# Patient Record
Sex: MALE | Race: WHITE | NOT HISPANIC OR LATINO | Employment: FULL TIME | URBAN - METROPOLITAN AREA
[De-identification: names, ages, dates, MRNs, and addresses within clinical notes are randomized per-mention and may not be internally consistent; named-entity substitution may affect disease eponyms.]

---

## 2024-04-22 ENCOUNTER — HOSPITAL ENCOUNTER (EMERGENCY)
Facility: CLINIC | Age: 64
Discharge: HOME | End: 2024-04-22
Attending: FAMILY MEDICINE | Admitting: FAMILY MEDICINE
Payer: MEDICARE

## 2024-04-22 ENCOUNTER — APPOINTMENT (OUTPATIENT)
Dept: CT IMAGING | Facility: CLINIC | Age: 64
End: 2024-04-22
Attending: FAMILY MEDICINE
Payer: MEDICARE

## 2024-04-22 ENCOUNTER — APPOINTMENT (OUTPATIENT)
Dept: GENERAL RADIOLOGY | Facility: CLINIC | Age: 64
End: 2024-04-22
Attending: FAMILY MEDICINE
Payer: MEDICARE

## 2024-04-22 VITALS
TEMPERATURE: 97.5 F | OXYGEN SATURATION: 100 % | SYSTOLIC BLOOD PRESSURE: 119 MMHG | HEART RATE: 60 BPM | BODY MASS INDEX: 25.9 KG/M2 | WEIGHT: 185 LBS | RESPIRATION RATE: 15 BRPM | DIASTOLIC BLOOD PRESSURE: 68 MMHG | HEIGHT: 71 IN

## 2024-04-22 DIAGNOSIS — R07.81 PLEURODYNIA: ICD-10-CM

## 2024-04-22 DIAGNOSIS — I26.93 SINGLE SUBSEGMENTAL PULMONARY EMBOLISM WITHOUT ACUTE COR PULMONALE (H): ICD-10-CM

## 2024-04-22 DIAGNOSIS — R07.89 LEFT-SIDED CHEST WALL PAIN: ICD-10-CM

## 2024-04-22 LAB
ALBUMIN SERPL BCG-MCNC: 4.4 G/DL (ref 3.5–5.2)
ALP SERPL-CCNC: 47 U/L (ref 40–150)
ALT SERPL W P-5'-P-CCNC: 25 U/L (ref 0–70)
ANION GAP SERPL CALCULATED.3IONS-SCNC: 13 MMOL/L (ref 7–15)
APTT PPP: 20 SECONDS (ref 22–38)
AST SERPL W P-5'-P-CCNC: 31 U/L (ref 0–45)
ATRIAL RATE - MUSE: 57 BPM
BASOPHILS # BLD AUTO: 0.1 10E3/UL (ref 0–0.2)
BASOPHILS NFR BLD AUTO: 1 %
BILIRUB SERPL-MCNC: 0.7 MG/DL
BUN SERPL-MCNC: 13.7 MG/DL (ref 8–23)
CALCIUM SERPL-MCNC: 9.4 MG/DL (ref 8.8–10.2)
CHLORIDE SERPL-SCNC: 104 MMOL/L (ref 98–107)
CREAT BLD-MCNC: 1 MG/DL (ref 0.7–1.3)
CREAT SERPL-MCNC: 0.96 MG/DL (ref 0.67–1.17)
CRP SERPL-MCNC: <3 MG/L
D DIMER PPP FEU-MCNC: 0.44 UG/ML FEU (ref 0–0.5)
DEPRECATED HCO3 PLAS-SCNC: 25 MMOL/L (ref 22–29)
DIASTOLIC BLOOD PRESSURE - MUSE: NORMAL MMHG
EGFRCR SERPLBLD CKD-EPI 2021: 88 ML/MIN/1.73M2
EGFRCR SERPLBLD CKD-EPI 2021: >60 ML/MIN/1.73M2
EOSINOPHIL # BLD AUTO: 0.2 10E3/UL (ref 0–0.7)
EOSINOPHIL NFR BLD AUTO: 3 %
ERYTHROCYTE [DISTWIDTH] IN BLOOD BY AUTOMATED COUNT: 12.3 % (ref 10–15)
GLUCOSE SERPL-MCNC: 112 MG/DL (ref 70–99)
HCT VFR BLD AUTO: 44 % (ref 40–53)
HGB BLD-MCNC: 14.6 G/DL (ref 13.3–17.7)
HOLD SPECIMEN: NORMAL
IMM GRANULOCYTES # BLD: 0 10E3/UL
IMM GRANULOCYTES NFR BLD: 1 %
INR PPP: 0.93 (ref 0.85–1.15)
INTERPRETATION ECG - MUSE: NORMAL
LIPASE SERPL-CCNC: 21 U/L (ref 13–60)
LYMPHOCYTES # BLD AUTO: 2.2 10E3/UL (ref 0.8–5.3)
LYMPHOCYTES NFR BLD AUTO: 34 %
MAGNESIUM SERPL-MCNC: 2.4 MG/DL (ref 1.7–2.3)
MCH RBC QN AUTO: 30.2 PG (ref 26.5–33)
MCHC RBC AUTO-ENTMCNC: 33.2 G/DL (ref 31.5–36.5)
MCV RBC AUTO: 91 FL (ref 78–100)
MONOCYTES # BLD AUTO: 0.6 10E3/UL (ref 0–1.3)
MONOCYTES NFR BLD AUTO: 9 %
NEUTROPHILS # BLD AUTO: 3.6 10E3/UL (ref 1.6–8.3)
NEUTROPHILS NFR BLD AUTO: 52 %
NRBC # BLD AUTO: 0 10E3/UL
NRBC BLD AUTO-RTO: 0 /100
NT-PROBNP SERPL-MCNC: <36 PG/ML (ref 0–900)
P AXIS - MUSE: NORMAL DEGREES
PLATELET # BLD AUTO: 134 10E3/UL (ref 150–450)
POTASSIUM SERPL-SCNC: 4.5 MMOL/L (ref 3.4–5.3)
PR INTERVAL - MUSE: 140 MS
PROT SERPL-MCNC: 7.3 G/DL (ref 6.4–8.3)
QRS DURATION - MUSE: 96 MS
QT - MUSE: 412 MS
QTC - MUSE: 401 MS
R AXIS - MUSE: 169 DEGREES
RADIOLOGIST FLAGS: ABNORMAL
RBC # BLD AUTO: 4.84 10E6/UL (ref 4.4–5.9)
SODIUM SERPL-SCNC: 142 MMOL/L (ref 135–145)
SYSTOLIC BLOOD PRESSURE - MUSE: NORMAL MMHG
T AXIS - MUSE: 159 DEGREES
TROPONIN T SERPL HS-MCNC: 7 NG/L
TROPONIN T SERPL HS-MCNC: 9 NG/L
VENTRICULAR RATE- MUSE: 57 BPM
WBC # BLD AUTO: 6.6 10E3/UL (ref 4–11)

## 2024-04-22 PROCEDURE — 250N000011 HC RX IP 250 OP 636: Performed by: FAMILY MEDICINE

## 2024-04-22 PROCEDURE — 71046 X-RAY EXAM CHEST 2 VIEWS: CPT

## 2024-04-22 PROCEDURE — 99285 EMERGENCY DEPT VISIT HI MDM: CPT | Mod: 25 | Performed by: FAMILY MEDICINE

## 2024-04-22 PROCEDURE — 85730 THROMBOPLASTIN TIME PARTIAL: CPT | Performed by: FAMILY MEDICINE

## 2024-04-22 PROCEDURE — 93005 ELECTROCARDIOGRAM TRACING: CPT | Performed by: FAMILY MEDICINE

## 2024-04-22 PROCEDURE — 93010 ELECTROCARDIOGRAM REPORT: CPT | Performed by: FAMILY MEDICINE

## 2024-04-22 PROCEDURE — 258N000003 HC RX IP 258 OP 636: Performed by: FAMILY MEDICINE

## 2024-04-22 PROCEDURE — 86140 C-REACTIVE PROTEIN: CPT | Performed by: FAMILY MEDICINE

## 2024-04-22 PROCEDURE — 85025 COMPLETE CBC W/AUTO DIFF WBC: CPT | Performed by: FAMILY MEDICINE

## 2024-04-22 PROCEDURE — 83880 ASSAY OF NATRIURETIC PEPTIDE: CPT | Performed by: FAMILY MEDICINE

## 2024-04-22 PROCEDURE — 36415 COLL VENOUS BLD VENIPUNCTURE: CPT | Performed by: FAMILY MEDICINE

## 2024-04-22 PROCEDURE — 84484 ASSAY OF TROPONIN QUANT: CPT | Performed by: FAMILY MEDICINE

## 2024-04-22 PROCEDURE — 80053 COMPREHEN METABOLIC PANEL: CPT | Performed by: FAMILY MEDICINE

## 2024-04-22 PROCEDURE — G1010 CDSM STANSON: HCPCS

## 2024-04-22 PROCEDURE — 82565 ASSAY OF CREATININE: CPT | Mod: 91

## 2024-04-22 PROCEDURE — 96360 HYDRATION IV INFUSION INIT: CPT | Mod: 59 | Performed by: FAMILY MEDICINE

## 2024-04-22 PROCEDURE — 85610 PROTHROMBIN TIME: CPT | Performed by: FAMILY MEDICINE

## 2024-04-22 PROCEDURE — 96361 HYDRATE IV INFUSION ADD-ON: CPT | Performed by: FAMILY MEDICINE

## 2024-04-22 PROCEDURE — 85379 FIBRIN DEGRADATION QUANT: CPT | Performed by: FAMILY MEDICINE

## 2024-04-22 PROCEDURE — 83735 ASSAY OF MAGNESIUM: CPT | Performed by: FAMILY MEDICINE

## 2024-04-22 PROCEDURE — 250N000013 HC RX MED GY IP 250 OP 250 PS 637: Performed by: FAMILY MEDICINE

## 2024-04-22 PROCEDURE — 250N000009 HC RX 250: Performed by: FAMILY MEDICINE

## 2024-04-22 PROCEDURE — 83690 ASSAY OF LIPASE: CPT | Performed by: FAMILY MEDICINE

## 2024-04-22 RX ORDER — IOPAMIDOL 755 MG/ML
100 INJECTION, SOLUTION INTRAVASCULAR ONCE
Status: COMPLETED | OUTPATIENT
Start: 2024-04-22 | End: 2024-04-22

## 2024-04-22 RX ORDER — NITROGLYCERIN 0.4 MG/1
0.4 TABLET SUBLINGUAL ONCE
Status: COMPLETED | OUTPATIENT
Start: 2024-04-22 | End: 2024-04-22

## 2024-04-22 RX ORDER — ASPIRIN 81 MG/1
162 TABLET, CHEWABLE ORAL ONCE
Status: COMPLETED | OUTPATIENT
Start: 2024-04-22 | End: 2024-04-22

## 2024-04-22 RX ORDER — LIDOCAINE 40 MG/G
CREAM TOPICAL
Status: DISCONTINUED | OUTPATIENT
Start: 2024-04-22 | End: 2024-04-22 | Stop reason: HOSPADM

## 2024-04-22 RX ADMIN — SODIUM CHLORIDE 1000 ML: 9 INJECTION, SOLUTION INTRAVENOUS at 10:37

## 2024-04-22 RX ADMIN — IOPAMIDOL 64 ML: 755 INJECTION, SOLUTION INTRAVENOUS at 13:57

## 2024-04-22 RX ADMIN — ASPIRIN 81 MG CHEWABLE TABLET 162 MG: 81 TABLET CHEWABLE at 10:36

## 2024-04-22 RX ADMIN — NITROGLYCERIN 0.4 MG: 0.4 TABLET SUBLINGUAL at 12:21

## 2024-04-22 RX ADMIN — SODIUM CHLORIDE 84 ML: 9 INJECTION, SOLUTION INTRAVENOUS at 13:57

## 2024-04-22 ASSESSMENT — COLUMBIA-SUICIDE SEVERITY RATING SCALE - C-SSRS
1. IN THE PAST MONTH, HAVE YOU WISHED YOU WERE DEAD OR WISHED YOU COULD GO TO SLEEP AND NOT WAKE UP?: NO
2. HAVE YOU ACTUALLY HAD ANY THOUGHTS OF KILLING YOURSELF IN THE PAST MONTH?: NO
6. HAVE YOU EVER DONE ANYTHING, STARTED TO DO ANYTHING, OR PREPARED TO DO ANYTHING TO END YOUR LIFE?: NO

## 2024-04-22 ASSESSMENT — ACTIVITIES OF DAILY LIVING (ADL)
ADLS_ACUITY_SCORE: 33
ADLS_ACUITY_SCORE: 35

## 2024-04-22 NOTE — DISCHARGE INSTRUCTIONS
Home.  Your labs look good.  Your xray did not show any concerns.  Your EKG was done also without any concerns for heart injury.  Heart enzymes and blood clot tests negative.  Bedside echo did not show any obvious concerns or fluid around the heart.  We did do a CT of your chest which did not show any pneumonia, tumors, changes in your aorta.  The CT did show a small single nonocclusive blood clot in the right upper lung. Discussed with Hematology who felt with small clot in the right lung with normal d dimer and no symptoms on that side, unlikely this is causing your symptoms and more likely an incidental finding and not starting on blood thinners is reasonable approach.  See MD this week for a recheck.  Tylenol and ibuprofen and ice as needed.  Return if any concerns or worse symptoms.    Results for orders placed or performed during the hospital encounter of 04/22/24   XR Chest 2 Views     Status: None    Narrative    CHEST TWO VIEWS  4/22/2024 11:08 AM     HISTORY:  Chest pain.    COMPARISON: None.      Impression    IMPRESSION: Negative chest. Lungs clear.    CASSY OBANDO MD         SYSTEM ID:  [id number]   POC US ECHO LIMITED     Status: None    Impression    Limited Bedside Cardiac Ultrasound, performed and interpreted by me.   Indication: Chest Pain.  Parasternal long axis, parasternal short axis, and apical 4 chamber views were acquired.   Image quality was satisfactory.    Findings:    Global left ventricular function appears intact.  Chambers do not appear dilated.  There is no evidence of free fluid within the pericardium.    IMPRESSION: Grossly normal left ventricular function and chamber size.  No pericardial effusion..       CT Chest Pulmonary Embolism w Contrast     Status: Abnormal (Preliminary result)   Result Value Ref Range    Radiologist flags Pulmonary embolus (AA)     Narrative    CT CHEST PULMONARY EMBOLISM W CONTRAST 4/22/2024 2:12 PM    CLINICAL HISTORY: Shortness of breath. Pleuritic  chest pain.    TECHNIQUE: CT angiogram chest during arterial phase injection IV  contrast. 2D and 3D MIP reconstructions were performed by the CT  technologist. Dose reduction techniques were used.   CONTRAST: 64 mL Isovue 370    COMPARISON: None.    FINDINGS:  ANGIOGRAM CHEST: There is a small nonocclusive pulmonary embolus  within a subsegmental branch to the right upper lobe. No pulmonary  emboli are identified on the left. No convincing CT evidence of right  heart strain. Thoracic aorta is negative for dissection.     LUNGS AND PLEURA: Small calcified granuloma in the left upper lobe. No  lung masses or consolidations. No pleural effusions.    MEDIASTINUM/AXILLAE: No lymphadenopathy in the chest. No pericardial  effusion.    CORONARY ARTERY CALCIFICATION: None.    UPPER ABDOMEN: Limited views of the upper abdomen are unremarkable.    MUSCULOSKELETAL: Unremarkable.      Impression    IMPRESSION:  1.  Small nonocclusive pulmonary embolus within a subsegmental branch  of the right upper lobe.  2.  No other pulmonary emboli are identified.    [Critical Result: Pulmonary embolus]    Finding was identified on 4/22/2024 2:25 PM.     Dr. Sandoval was contacted by me on 4/22/2024 2:25 PM and verbalized  understanding of the critical result.    [place] Draw     Status: None (In process)    Narrative    The following orders were created for panel order [place] Draw.  Procedure                               Abnormality         Status                     ---------                               -----------         ------                     Extra Blue Top Tube[[id number]]                                                         Extra Red Top Tube[[id number]]                               Final result               Extra Green Top (Lithium...[[id number]]                                                 Extra Purple Top Tube[[id number]]                                                         Please view results for these tests on the  individual orders.   CRP inflammation     Status: Normal   Result Value Ref Range    CRP Inflammation <3.00 <5.00 mg/L   INR     Status: Normal   Result Value Ref Range    INR 0.93 0.85 - 1.15   Partial thromboplastin time     Status: Abnormal   Result Value Ref Range    aPTT 20 (L) 22 - 38 Seconds   D dimer quantitative     Status: Normal   Result Value Ref Range    D-Dimer Quantitative 0.44 0.00 - 0.50 ug/mL FEU    Narrative    This D-dimer assay is intended for use in conjunction with a clinical pretest probability assessment model to exclude pulmonary embolism (PE) and deep venous thrombosis (DVT) in outpatients suspected of PE or DVT. The cut-off value is 0.50 ug/mL FEU.    For patients 50 years of age or older, the application of age-adjusted cut-off values for D-Dimer may increase the specificity without significant effect on sensitivity. The literature suggested calculation age adjusted cut-off in ug/L = age in years x 10 ug/L. The results in this laboratory are reported as ug/mL rather than ug/L. The calculation for age adjusted cut off in ug/mL= age in years x 0.01 ug/mL. For example, the cut off for a 76 year old male is 74 x 0.01 ug/mL = 0.76 ug/mL (760 ug/L).    M Tabby et al. Age adjusted D-dimer cut-off levels to rule out pulmonary embolism: The ADJUST-PE Study. BROOK 2014;311:2277-6643.; HJ Lizett et al. Diagnostic accuracy of conventional or age adjusted D-dimer cutoff values in older patients with suspected venous thromboembolism. Systemic review and meta-analysis. BMJ 2013:346:f2492.   Comprehensive metabolic panel     Status: Abnormal   Result Value Ref Range    Sodium 142 135 - 145 mmol/L    Potassium 4.5 3.4 - 5.3 mmol/L    Carbon Dioxide (CO2) 25 22 - 29 mmol/L    Anion Gap 13 7 - 15 mmol/L    Urea Nitrogen 13.7 8.0 - 23.0 mg/dL    Creatinine 0.96 0.67 - 1.17 mg/dL    GFR Estimate 88 >60 mL/min/1.73m2    Calcium 9.4 8.8 - 10.2 mg/dL    Chloride 104 98 - 107 mmol/L    Glucose 112 (H) 70 - 99  mg/dL    Alkaline Phosphatase 47 40 - 150 U/L    AST 31 0 - 45 U/L    ALT 25 0 - 70 U/L    Protein Total 7.3 6.4 - 8.3 g/dL    Albumin 4.4 3.5 - 5.2 g/dL    Bilirubin Total 0.7 <=1.2 mg/dL   Magnesium     Status: Abnormal   Result Value Ref Range    Magnesium 2.4 (H) 1.7 - 2.3 mg/dL   Lipase     Status: Normal   Result Value Ref Range    Lipase 21 13 - 60 U/L   Troponin T, High Sensitivity     Status: Normal   Result Value Ref Range    Troponin T, High Sensitivity 9 <=22 ng/L   Nt probnp inpatient (BNP)     Status: Normal   Result Value Ref Range    N terminal Pro BNP Inpatient <36 0 - 900 pg/mL   Extra Red Top Tube     Status: None   Result Value Ref Range    Hold Specimen JI    CBC with platelets and differential     Status: Abnormal   Result Value Ref Range    WBC Count 6.6 4.0 - 11.0 10e3/uL    RBC Count 4.84 4.40 - 5.90 10e6/uL    Hemoglobin 14.6 13.3 - 17.7 g/dL    Hematocrit 44.0 40.0 - 53.0 %    MCV 91 78 - 100 fL    MCH 30.2 26.5 - 33.0 pg    MCHC 33.2 31.5 - 36.5 g/dL    RDW 12.3 10.0 - 15.0 %    Platelet Count 134 (L) 150 - 450 10e3/uL    % Neutrophils 52 %    % Lymphocytes 34 %    % Monocytes 9 %    % Eosinophils 3 %    % Basophils 1 %    % Immature Granulocytes 1 %    NRBCs per 100 WBC 0 <1 /100    Absolute Neutrophils 3.6 1.6 - 8.3 10e3/uL    Absolute Lymphocytes 2.2 0.8 - 5.3 10e3/uL    Absolute Monocytes 0.6 0.0 - 1.3 10e3/uL    Absolute Eosinophils 0.2 0.0 - 0.7 10e3/uL    Absolute Basophils 0.1 0.0 - 0.2 10e3/uL    Absolute Immature Granulocytes 0.0 <=0.4 10e3/uL    Absolute NRBCs 0.0 10e3/uL   Troponin T, High Sensitivity     Status: Normal   Result Value Ref Range    Troponin T, High Sensitivity 7 <=22 ng/L   Creatinine POCT     Status: Normal   Result Value Ref Range    Creatinine POCT 1.0 0.7 - 1.3 mg/dL    GFR, ESTIMATED POCT >60 >60 mL/min/1.73m2   EKG 12 lead     Status: None   Result Value Ref Range    Systolic Blood Pressure  mmHg    Diastolic Blood Pressure  mmHg    Ventricular Rate 57  BPM    Atrial Rate 57 BPM    MT Interval 140 ms    QRS Duration 96 ms     ms    QTc 401 ms    P Axis  degrees    R AXIS 169 degrees    T Axis 159 degrees    Interpretation ECG       Sinus bradycardia with Premature atrial complexes in a pattern of bigeminy  Right axis deviation  Septal infarct , age undetermined  Abnormal ECG  Unconfirmed report - interpretation of this ECG is computer generated - see medical record for final interpretation    Confirmed by - EMERGENCY ROOM, PHYSICIAN ([id number]),  DONYA WINSLOW ([id number]) on 4/22/2024 3:30:31 PM     CBC with platelets differential     Status: Abnormal    Narrative    The following orders were created for panel order CBC with platelets differential.  Procedure                               Abnormality         Status                     ---------                               -----------         ------                     CBC with platelets and d...[[id number]]  Abnormal            Final result                 Please view results for these tests on the individual orders.

## 2024-04-22 NOTE — ED TRIAGE NOTES
Patient reports pain is worst when he is moving around. Wife also reports chest hurts when he takes a deep breath     Triage Assessment (Adult)       Row Name 04/22/24 0959          Triage Assessment    Airway WDL WDL        Respiratory WDL    Respiratory WDL WDL        Skin Circulation/Temperature WDL    Skin Circulation/Temperature WDL WDL        Cardiac WDL    Cardiac WDL X;chest pain     Cardiac Rhythm NSR        Chest Pain Assessment    Chest Pain Location midsternal     Character aching     Precipitating Factors activity

## 2024-04-22 NOTE — ED PROVIDER NOTES
ED Provider Note  [facility]      History     Chief Complaint   Patient presents with    Chest Pain     Intermittent Chest pain x 3 days, pain is described as aching and pain radiates to his upper back.     HPI  Casey Yeboah is a 63 year old male who presents emergency room with chest pain over the last few days.  Patient notes last few days having initially some left shoulder pain but then noted some chest pain is somewhat worse with deep breathing and moving around.  Patient notes all left-sided and in the left shoulder blade only.  No rash noted.  Recently had a shingles vaccine.  Some shortness of breath associated with the 2 some pain in the back also.  Patient apparently was getting go to have a abdominal aortic aneurysm screening done as he had remote history of smoking several years ago but is not a current smoker had no abdominal symptoms otherwise.  He does not have documented history of any type of dissection or aneurysm or other vascular disease no history cardiac disease.  Patient otherwise relatively healthy.  No rash reported.  Pain is worse with any movement and deep breathing not necessarily just with sitting up and laying back to visit altered.    No leg pain or leg swelling no history of DVTs or family history cardiac disease or VTE.  Patient now had traveled to [facility] had returned slightly over a month ago but did not experience any of the symptoms etc.  Now presents with his wife for further evaluation.  As noted the pain is not necessarily improved sitting up and worsening leg back is just worsened with some rotation increasing pain over the lateral chest area and also the shoulder blade area with movement.  At rest feels relatively comfortable.  No fevers or chills otherwise noted.    Past Medical History  Past Medical History:   Diagnosis Date    MEDICAL HISTORY OF -     right foot pain     Past Surgical History:   Procedure Laterality Date    SURGICAL  "HISTORY OF -   1978    gastric lavage     No current outpatient medications on file.    Allergies   Allergen Reactions    Penicillins Rash     Family History  Family History   Problem Relation Age of Onset    Cancer Mother         fatal renal cancer with mets     Social History   Social History     Tobacco Use    Smoking status: Former    Smokeless tobacco: Never    Tobacco comments:     smoked from 72 until 88; smoke on average 1ppd   Substance Use Topics    Alcohol use: Yes     Comment: 6 drinks per week         A medically appropriate review of systems was performed with pertinent positives and negatives noted in the HPI, and all other systems negative.    Physical Exam   BP: 125/67  Pulse: 67  Temp: 97.5  F (36.4  C)  Resp: 16  Height: 179.1 cm (5' 10.5\")  Weight: 83.9 kg (185 lb)  SpO2: 99 %  Physical Exam  Vitals and nursing note reviewed.   Constitutional:       General: He is in acute distress.      Appearance: He is well-developed. He is not toxic-appearing or diaphoretic.      Comments: Stable here otherwise speaking full sentences not writhing in pain not diaphoretic.   HENT:      Head: Normocephalic and atraumatic.      Nose: Nose normal. No congestion.      Mouth/Throat:      Mouth: Mucous membranes are moist.      Pharynx: Oropharynx is clear.   Eyes:      General: No scleral icterus.     Extraocular Movements: Extraocular movements intact.      Conjunctiva/sclera: Conjunctivae normal.      Pupils: Pupils are equal, round, and reactive to light.   Cardiovascular:      Rate and Rhythm: Normal rate and regular rhythm.   Pulmonary:      Effort: Pulmonary effort is normal. No respiratory distress.      Comments: Lateral left chest area in the rhomboidal area etc. some tenderness on the palpation and movement.  Chest:      Chest wall: Tenderness present.   Abdominal:      General: Abdomen is flat. There is no distension.      Palpations: Abdomen is soft. There is no mass.      Tenderness: There is no " abdominal tenderness. There is no guarding or rebound.      Hernia: No hernia is present.   Musculoskeletal:         General: No swelling, tenderness, deformity or signs of injury. Normal range of motion.      Cervical back: Normal range of motion and neck supple.      Right lower leg: No edema.      Left lower leg: No edema.      Comments: Negative Homans' sign   Lymphadenopathy:      Cervical: No cervical adenopathy.   Skin:     General: Skin is warm and dry.      Capillary Refill: Capillary refill takes less than 2 seconds.      Coloration: Skin is not jaundiced or pale.      Findings: No erythema or rash.   Neurological:      General: No focal deficit present.      Mental Status: He is alert and oriented to person, place, and time.      Cranial Nerves: No cranial nerve deficit.      Sensory: No sensory deficit.      Coordination: Coordination normal.   Psychiatric:         Mood and Affect: Mood normal.         Thought Content: Thought content normal.         Judgment: Judgment normal.           ED Course, Procedures, & Data        Records are in epic.  Patient with prediabetes hyperlipidemia screening etc.  History of obstructive sleep apnea noted.  Routine screening were ordered.    EKG done revealing sinus rhythm with premature atrial complexes no tachycardia no marked ST elevation seen or depression.  Aspirin ordered.  IV with labs imaging we will get a bedside ultrasound consider further imaging did add a D-dimer to rule out other things such as pericarditis PE versus aortic issues.    Patient given 2 baby aspirin here in the ER while during workup.  EKG as noted was done revealing sinus bradycardia with some PACs.  No other hyperacute changes.  2 view chest x-ray done revealing no pneumothorax no infiltrates no effusions no florid heart failure interpreted by myself.    Point-of-care ultrasound done by myself reveals no aortic root or aortic valvular changes mitral valve appears normal no pericardial  effusion wall function does not show any obvious wall motion abnormalities that are visible.  Laboratory testing white count was 6.6 hemoglobin is 14.6.  Platelets are 134 D-dimer 0.44 within normal limits  INR 0.93.  Sodium 142 potassium 4.5 bicarb is 25 gap 13.  Creatinine 0.96.  Glucose 112 liver function test normal and his magnesium 2.4.  Lipase 21 CRP is negative  Troponin x 2 were 9 and 7 in decreasing fashion.    Here in the ER with patient's symptoms only left-sided CT scan was done of the chest PE protocol.  Discussed with radiology findings reveal that of 1 small right upper lobe subsegmental pulmonary emboli age-indeterminate without any signs of infarction there are no other abnormalities there are no other emboli there is no sign of heart strain aorta appears normal without any other acute infiltrates or masses.    Reviewed with patient and wife I talked to Dr. Pemberton hematology.  Discussed with patient also feel at this point since it is a subsegmental nonocclusive small single pulmonary emboli unclear age without any other associated symptoms or any other risk factors no signs of any DVT etc. no family history no history of cancer felt surveillance is appropriate this point especially he does not have any symptoms on his right side where this is located and his D-dimer is within normal limits.  Discussed with wife and patient along with patient's daughter who is a physician and agree at this point there are certainly risks of placing the patient on a DOAC bleeding risks and patient does agree at this point his pain is all reproducible with any twisting or movement on that left side only I do not see any zoster but he should watch for rash he is comfortable going home will treat more for pleurodynia pleurisy chest wall type pain and monitor symptoms with close follow-up with MD for surveillance.  If any changes should return but at this point patient feels fine I think that is very appropriate that he  is not treated for this solitary was likely incidental pulmonary emboli that may have occurred from his airplane trip to [facility] in the last few months.    At this point patient is comfortable wants to go home wife agrees patient be discharged home I did talk to patient's daughter who is a physician also agreed with our plan.  Recommendation for close follow-up in the next 2 days with primary physician to review all images etc. and further evaluation if needed.  Patient will return if any concerns at all.  He is comfortable's plan and discharge.      Procedures  Results for orders placed during the hospital encounter of 04/22/24    POC US ECHO LIMITED    Impression  Limited Bedside Cardiac Ultrasound, performed and interpreted by me.  Indication: Chest Pain.  Parasternal long axis, parasternal short axis, and apical 4 chamber views were acquired.  Image quality was satisfactory.    Findings:  Global left ventricular function appears intact.  Chambers do not appear dilated.  There is no evidence of free fluid within the pericardium.    IMPRESSION: Grossly normal left ventricular function and chamber size.  No pericardial effusion..            EKG Interpretation:      Interpreted by Thomas Sandoval MD  Time reviewed: 1004  Symptoms at time of EKG: cp   Rhythm: normal sinus burke  Rate: normal 57  Axis: right  Ectopy: none  Conduction: occ pac's  ST Segments/ T Waves: No hyperacute ST-T wave changes  Q Waves: none  Comparison to prior: No old EKG available    Clinical Impression: sinus burke with pac's          Results for orders placed or performed during the hospital encounter of 04/22/24   XR Chest 2 Views     Status: None    Narrative    CHEST TWO VIEWS  4/22/2024 11:08 AM     HISTORY:  Chest pain.    COMPARISON: None.      Impression    IMPRESSION: Negative chest. Lungs clear.    CASSY OBANDO MD         SYSTEM ID:  [id number]   POC US ECHO LIMITED     Status: None    Impression    Limited Bedside  Cardiac Ultrasound, performed and interpreted by me.   Indication: Chest Pain.  Parasternal long axis, parasternal short axis, and apical 4 chamber views were acquired.   Image quality was satisfactory.    Findings:    Global left ventricular function appears intact.  Chambers do not appear dilated.  There is no evidence of free fluid within the pericardium.    IMPRESSION: Grossly normal left ventricular function and chamber size.  No pericardial effusion..       CT Chest Pulmonary Embolism w Contrast     Status: Abnormal   Result Value Ref Range    Radiologist flags Pulmonary embolus (AA)     Narrative    CT CHEST PULMONARY EMBOLISM W CONTRAST 4/22/2024 2:12 PM    CLINICAL HISTORY: Shortness of breath. Pleuritic chest pain.    TECHNIQUE: CT angiogram chest during arterial phase injection IV  contrast. 2D and 3D MIP reconstructions were performed by the CT  technologist. Dose reduction techniques were used.   CONTRAST: 64 mL Isovue 370    COMPARISON: None.    FINDINGS:  ANGIOGRAM CHEST: There is a small nonocclusive pulmonary embolus  within a subsegmental branch to the right upper lobe. No pulmonary  emboli are identified on the left. No convincing CT evidence of right  heart strain. Thoracic aorta is negative for dissection.     LUNGS AND PLEURA: Small calcified granuloma in the left upper lobe. No  lung masses or consolidations. No pleural effusions.    MEDIASTINUM/AXILLAE: No lymphadenopathy in the chest. No pericardial  effusion.    CORONARY ARTERY CALCIFICATION: None.    UPPER ABDOMEN: Limited views of the upper abdomen are unremarkable.    MUSCULOSKELETAL: Unremarkable.      Impression    IMPRESSION:  1.  Small nonocclusive pulmonary embolus within a subsegmental branch  of the right upper lobe.  2.  No other pulmonary emboli are identified.    [Critical Result: Pulmonary embolus]    Finding was identified on 4/22/2024 2:25 PM.     Dr. Sandoval was contacted by me on 4/22/2024 2:25 PM and  verbalized  understanding of the critical result.     CASSY OBANDO MD         SYSTEM ID:  [id number]   [place] Draw     Status: None (In process)    Narrative    The following orders were created for panel order [place] Draw.  Procedure                               Abnormality         Status                     ---------                               -----------         ------                     Extra Blue Top Tube[[id number]]                                                         Extra Red Top Tube[[id number]]                               Final result               Extra Green Top (Lithium...[[id number]]                                                 Extra Purple Top Tube[[id number]]                                                         Please view results for these tests on the individual orders.   CRP inflammation     Status: Normal   Result Value Ref Range    CRP Inflammation <3.00 <5.00 mg/L   INR     Status: Normal   Result Value Ref Range    INR 0.93 0.85 - 1.15   Partial thromboplastin time     Status: Abnormal   Result Value Ref Range    aPTT 20 (L) 22 - 38 Seconds   D dimer quantitative     Status: Normal   Result Value Ref Range    D-Dimer Quantitative 0.44 0.00 - 0.50 ug/mL FEU    Narrative    This D-dimer assay is intended for use in conjunction with a clinical pretest probability assessment model to exclude pulmonary embolism (PE) and deep venous thrombosis (DVT) in outpatients suspected of PE or DVT. The cut-off value is 0.50 ug/mL FEU.    For patients 50 years of age or older, the application of age-adjusted cut-off values for D-Dimer may increase the specificity without significant effect on sensitivity. The literature suggested calculation age adjusted cut-off in ug/L = age in years x 10 ug/L. The results in this laboratory are reported as ug/mL rather than ug/L. The calculation for age adjusted cut off in ug/mL= age in years x 0.01 ug/mL. For example, the cut off for a 76 year old male is 74  x 0.01 ug/mL = 0.76 ug/mL (760 ug/L).    M Tabby et al. Age adjusted D-dimer cut-off levels to rule out pulmonary embolism: The ADJUST-PE Study. BROOK 2014;311:8484-4266.; HJ Lizett et al. Diagnostic accuracy of conventional or age adjusted D-dimer cutoff values in older patients with suspected venous thromboembolism. Systemic review and meta-analysis. BMJ 2013:346:f2492.   Comprehensive metabolic panel     Status: Abnormal   Result Value Ref Range    Sodium 142 135 - 145 mmol/L    Potassium 4.5 3.4 - 5.3 mmol/L    Carbon Dioxide (CO2) 25 22 - 29 mmol/L    Anion Gap 13 7 - 15 mmol/L    Urea Nitrogen 13.7 8.0 - 23.0 mg/dL    Creatinine 0.96 0.67 - 1.17 mg/dL    GFR Estimate 88 >60 mL/min/1.73m2    Calcium 9.4 8.8 - 10.2 mg/dL    Chloride 104 98 - 107 mmol/L    Glucose 112 (H) 70 - 99 mg/dL    Alkaline Phosphatase 47 40 - 150 U/L    AST 31 0 - 45 U/L    ALT 25 0 - 70 U/L    Protein Total 7.3 6.4 - 8.3 g/dL    Albumin 4.4 3.5 - 5.2 g/dL    Bilirubin Total 0.7 <=1.2 mg/dL   Magnesium     Status: Abnormal   Result Value Ref Range    Magnesium 2.4 (H) 1.7 - 2.3 mg/dL   Lipase     Status: Normal   Result Value Ref Range    Lipase 21 13 - 60 U/L   Troponin T, High Sensitivity     Status: Normal   Result Value Ref Range    Troponin T, High Sensitivity 9 <=22 ng/L   Nt probnp inpatient (BNP)     Status: Normal   Result Value Ref Range    N terminal Pro BNP Inpatient <36 0 - 900 pg/mL   Extra Red Top Tube     Status: None   Result Value Ref Range    Hold Specimen JIC    CBC with platelets and differential     Status: Abnormal   Result Value Ref Range    WBC Count 6.6 4.0 - 11.0 10e3/uL    RBC Count 4.84 4.40 - 5.90 10e6/uL    Hemoglobin 14.6 13.3 - 17.7 g/dL    Hematocrit 44.0 40.0 - 53.0 %    MCV 91 78 - 100 fL    MCH 30.2 26.5 - 33.0 pg    MCHC 33.2 31.5 - 36.5 g/dL    RDW 12.3 10.0 - 15.0 %    Platelet Count 134 (L) 150 - 450 10e3/uL    % Neutrophils 52 %    % Lymphocytes 34 %    % Monocytes 9 %    % Eosinophils 3 %    %  Basophils 1 %    % Immature Granulocytes 1 %    NRBCs per 100 WBC 0 <1 /100    Absolute Neutrophils 3.6 1.6 - 8.3 10e3/uL    Absolute Lymphocytes 2.2 0.8 - 5.3 10e3/uL    Absolute Monocytes 0.6 0.0 - 1.3 10e3/uL    Absolute Eosinophils 0.2 0.0 - 0.7 10e3/uL    Absolute Basophils 0.1 0.0 - 0.2 10e3/uL    Absolute Immature Granulocytes 0.0 <=0.4 10e3/uL    Absolute NRBCs 0.0 10e3/uL   Troponin T, High Sensitivity     Status: Normal   Result Value Ref Range    Troponin T, High Sensitivity 7 <=22 ng/L   Creatinine POCT     Status: Normal   Result Value Ref Range    Creatinine POCT 1.0 0.7 - 1.3 mg/dL    GFR, ESTIMATED POCT >60 >60 mL/min/1.73m2   EKG 12 lead     Status: None   Result Value Ref Range    Systolic Blood Pressure  mmHg    Diastolic Blood Pressure  mmHg    Ventricular Rate 57 BPM    Atrial Rate 57 BPM    KS Interval 140 ms    QRS Duration 96 ms     ms    QTc 401 ms    P Axis  degrees    R AXIS 169 degrees    T Axis 159 degrees    Interpretation ECG       Sinus bradycardia with Premature atrial complexes in a pattern of bigeminy  Right axis deviation  Septal infarct , age undetermined  Abnormal ECG  Unconfirmed report - interpretation of this ECG is computer generated - see medical record for final interpretation    Confirmed by - EMERGENCY ROOM, PHYSICIAN ([id number]),  DONYA WINSLOW ([id number]) on 4/22/2024 3:30:31 PM     CBC with platelets differential     Status: Abnormal    Narrative    The following orders were created for panel order CBC with platelets differential.  Procedure                               Abnormality         Status                     ---------                               -----------         ------                     CBC with platelets and d...[[id number]]  Abnormal            Final result                 Please view results for these tests on the individual orders.     Medications   sodium chloride 0.9% BOLUS 1,000 mL (0 mLs Intravenous Stopped 4/22/24 1225)   aspirin (ASA)  chewable tablet 162 mg (162 mg Oral $Given 4/22/24 1036)   nitroGLYcerin (NITROSTAT) sublingual tablet 0.4 mg (0.4 mg Sublingual $Given 4/22/24 1221)   iopamidol (ISOVUE-370) solution 100 mL (64 mLs Intravenous $Given 4/22/24 1355)   sodium chloride 0.9 % bag 500mL for CT scan flush use (84 mLs As instructed $Given 4/22/24 1359)     Labs Ordered and Resulted from Time of ED Arrival to Time of ED Departure   PARTIAL THROMBOPLASTIN TIME - Abnormal       Result Value    aPTT 20 (*)    COMPREHENSIVE METABOLIC PANEL - Abnormal    Sodium 142      Potassium 4.5      Carbon Dioxide (CO2) 25      Anion Gap 13      Urea Nitrogen 13.7      Creatinine 0.96      GFR Estimate 88      Calcium 9.4      Chloride 104      Glucose 112 (*)     Alkaline Phosphatase 47      AST 31      ALT 25      Protein Total 7.3      Albumin 4.4      Bilirubin Total 0.7     MAGNESIUM - Abnormal    Magnesium 2.4 (*)    CBC WITH PLATELETS AND DIFFERENTIAL - Abnormal    WBC Count 6.6      RBC Count 4.84      Hemoglobin 14.6      Hematocrit 44.0      MCV 91      MCH 30.2      MCHC 33.2      RDW 12.3      Platelet Count 134 (*)     % Neutrophils 52      % Lymphocytes 34      % Monocytes 9      % Eosinophils 3      % Basophils 1      % Immature Granulocytes 1      NRBCs per 100 WBC 0      Absolute Neutrophils 3.6      Absolute Lymphocytes 2.2      Absolute Monocytes 0.6      Absolute Eosinophils 0.2      Absolute Basophils 0.1      Absolute Immature Granulocytes 0.0      Absolute NRBCs 0.0     CRP INFLAMMATION - Normal    CRP Inflammation <3.00     INR - Normal    INR 0.93     D DIMER QUANTITATIVE - Normal    D-Dimer Quantitative 0.44     LIPASE - Normal    Lipase 21     TROPONIN T, HIGH SENSITIVITY - Normal    Troponin T, High Sensitivity 9     NT PROBNP INPATIENT - Normal    N terminal Pro BNP Inpatient <36     TROPONIN T, HIGH SENSITIVITY - Normal    Troponin T, High Sensitivity 7     ISTAT CREATININE POCT - Normal    Creatinine POCT 1.0      GFR,  ESTIMATED POCT >60       CT Chest Pulmonary Embolism w Contrast   Final Result   Abnormal   IMPRESSION:   1.  Small nonocclusive pulmonary embolus within a subsegmental branch   of the right upper lobe.   2.  No other pulmonary emboli are identified.      [Critical Result: Pulmonary embolus]      Finding was identified on 4/22/2024 2:25 PM.       Dr. Sandoval was contacted by me on 4/22/2024 2:25 PM and verbalized   understanding of the critical result.       CASSY OBANDO MD            SYSTEM ID:  [id number]      XR Chest 2 Views   Final Result   IMPRESSION: Negative chest. Lungs clear.      CASSY OBANDO MD            SYSTEM ID:  [id number]      POC US ECHO LIMITED   Final Result   Limited Bedside Cardiac Ultrasound, performed and interpreted by me.    Indication: Chest Pain.   Parasternal long axis, parasternal short axis, and apical 4 chamber views were acquired.    Image quality was satisfactory.      Findings:     Global left ventricular function appears intact.   Chambers do not appear dilated.   There is no evidence of free fluid within the pericardium.      IMPRESSION: Grossly normal left ventricular function and chamber size.  No pericardial effusion..                   Critical care was not performed.     Medical Decision Making  The patient's presentation was of high complexity (an acute health issue posing potential threat to life or bodily function).    The patient's evaluation involved:  an assessment requiring an independent historian (see separate area of note for details)  review of external note(s) from 3+ sources (see separate area of note for details)  review of 3+ test result(s) ordered prior to this encounter (see separate area of note for details)  ordering and/or review of 3+ test(s) in this encounter (see separate area of note for details)  discussion of management or test interpretation with another health professional (see separate area of note for details)    The patient's management  necessitated high risk (a decision regarding hospitalization).    Assessment & Plan   63-year-old male presented ER with 3 days of left-sided chest pain that is worsened with movement rotation etc.  There is no rash had a shingles vaccine a few weeks ago patient had travel to [facility] in the last few months.  No history of any VTE family history of clotting disorders heart disease etc.  Patient was going to have an abdominal aneurysm just for routine screening as he had a remote history of smoking many years ago but is a non-smoker.  He has had this pain for the last 3 days presented the ER for evaluation is very much related to movement and breathing there are no symptoms on the right at all.  There is no rash no leg pain or leg swelling otherwise no history of previous DVTs or cardiac diseases EKG showed some PACs but otherwise no hyperacute changes chest x-ray without any acute findings labs troponin x 2 were negative D-dimer is within normal limits also.  Other labs stable we did try nitroglycerin did not make any difference with his pain also.  It is reproducible with movement and palpation here in the ER we did do a CT scan of the chest to rule out any other major issues the aorta heart etc. otherwise appeared normal the only finding is a very small subsegmental right upper lobe pulmonary emboli of indeterminate age without any signs of infarction there are no other emboli seen.  As noted subsegmental.  Discussed with hematology agree at this point patient symptoms are on the left which are reproducible he has no symptoms on the right where this is located felt this point surveillance would be appropriate not treating this with a DOAC as there is risk factors this patient does agree he is feeling fine at this point we will treat more conservative as may be myofascial pain etc.  Will use ice ibuprofen Tylenol close follow-up with MD for follow-up in the next couple days to review imaging and reassess and  return if any worsening symptoms all agrees with plan comfortable discharge.       I have reviewed the nursing notes. I have reviewed the findings, diagnosis, plan and need for follow up with the patient.    There are no discharge medications for this patient.      Final diagnoses:   Left-sided chest wall pain   Pleurodynia   Single subsegmental pulmonary embolism without acute cor pulmonale (H) - incidental RUL without right side symptoms normal d dimer       Thomas Sandoval  [facility] EMERGENCY DEPARTMENT  4/22/2024    This note was created at least in part by the use of dragon voice dictation system. Inadvertent typographical errors may still exist.  Thomas Sandoval MD.  Patient evaluated in the emergency department during the COVID-19 pandemic period. Careful attention to patients safety was addressed throughout the evaluation. Evaluation and treatment management was initiated with disposition made efficiently and appropriate as possible to minimize any risk of potential exposure to patient during this evaluation.       Thomas Sandoval MD  04/22/24 2018

## 2024-06-06 ENCOUNTER — HOSPITAL ENCOUNTER (EMERGENCY)
Facility: CLINIC | Age: 64
Discharge: HOME | End: 2024-06-07
Attending: STUDENT IN AN ORGANIZED HEALTH CARE EDUCATION/TRAINING PROGRAM | Admitting: STUDENT IN AN ORGANIZED HEALTH CARE EDUCATION/TRAINING PROGRAM
Payer: MEDICARE

## 2024-06-06 ENCOUNTER — APPOINTMENT (OUTPATIENT)
Dept: CT IMAGING | Facility: CLINIC | Age: 64
End: 2024-06-06
Attending: STUDENT IN AN ORGANIZED HEALTH CARE EDUCATION/TRAINING PROGRAM
Payer: MEDICARE

## 2024-06-06 DIAGNOSIS — S09.90XA INJURY OF HEAD, INITIAL ENCOUNTER: ICD-10-CM

## 2024-06-06 DIAGNOSIS — Z79.01 ANTICOAGULATED: ICD-10-CM

## 2024-06-06 PROCEDURE — 99283 EMERGENCY DEPT VISIT LOW MDM: CPT | Performed by: STUDENT IN AN ORGANIZED HEALTH CARE EDUCATION/TRAINING PROGRAM

## 2024-06-06 PROCEDURE — 70450 CT HEAD/BRAIN W/O DYE: CPT | Mod: ME

## 2024-06-06 PROCEDURE — 99284 EMERGENCY DEPT VISIT MOD MDM: CPT | Mod: 25 | Performed by: STUDENT IN AN ORGANIZED HEALTH CARE EDUCATION/TRAINING PROGRAM

## 2024-06-06 ASSESSMENT — ACTIVITIES OF DAILY LIVING (ADL): ADLS_ACUITY_SCORE: 33

## 2024-06-06 ASSESSMENT — COLUMBIA-SUICIDE SEVERITY RATING SCALE - C-SSRS
1. IN THE PAST MONTH, HAVE YOU WISHED YOU WERE DEAD OR WISHED YOU COULD GO TO SLEEP AND NOT WAKE UP?: NO
6. HAVE YOU EVER DONE ANYTHING, STARTED TO DO ANYTHING, OR PREPARED TO DO ANYTHING TO END YOUR LIFE?: NO
2. HAVE YOU ACTUALLY HAD ANY THOUGHTS OF KILLING YOURSELF IN THE PAST MONTH?: NO

## 2024-06-07 VITALS
HEIGHT: 71 IN | WEIGHT: 185 LBS | BODY MASS INDEX: 25.9 KG/M2 | TEMPERATURE: 97.8 F | DIASTOLIC BLOOD PRESSURE: 58 MMHG | RESPIRATION RATE: 16 BRPM | SYSTOLIC BLOOD PRESSURE: 124 MMHG | OXYGEN SATURATION: 98 % | HEART RATE: 68 BPM

## 2024-06-07 NOTE — DISCHARGE INSTRUCTIONS
You were seen in the emergency department due to a head injury while you are on blood thinners.  Your CT of your head at this point shows no signs of any bleeding.  There is a possibility although small that you could have a delayed brain bleed.  Signs of this would include increasing headache, nausea, vomiting, numbness or weakness of your arms or legs, difficulty with speech, or being very fatigued or lethargic.  If he has any of the signs we would recommend he return to the emergency department.    In light of the blood thinners that you are on, we would recommend that you hold these for the next couple of days.  You can begin taking them again on Monday, Swati 10, and call your primary care doctor at the beginning of the week to discuss follow-up

## 2024-06-07 NOTE — ED PROVIDER NOTES
ED Provider Note  [facility]      History     Chief Complaint   Patient presents with    Head Injury     Was moving boxes into a closet and coming back out of closet bumped head about 2100 tonight; there is a small abrasion on top of head and mild swelling; denies loss of consciousness or neck pain. Declined offer of coldpack; takes eliquis; denies headache, nausea or vomitting; is here to be seen due to being on blood thinner     HPI  Casey Yeboah is a 63 year old previously healthy male recently evaluated about 2 months ago, and found to have evidence of an incidentally found PE, not initially anticoagulated by anticoagulated by his PCP presenting to the emerge department due to a head injury.    He was helping his daughter move when he lifted his head up and the closet hitting it on a metal bar.  This was hard enough to cause immediate pain, but he did not lose consciousness, and has a small skin abrasion, but no bleeding that he notes.    No numbness, tingling or weakness.  No headache, nausea or vomiting.    Past Medical History  Past Medical History:   Diagnosis Date    Clotting disorder (H24)     MEDICAL HISTORY OF -     right foot pain     Past Surgical History:   Procedure Laterality Date    SURGICAL HISTORY OF -   1978    gastric lavage     apixaban ANTICOAGULANT (ELIQUIS) 5 MG tablet      Allergies   Allergen Reactions    Cephalosporins      rash and diarrhea per Pt    Latex Unknown    Penicillins Rash     Family History  Family History   Problem Relation Age of Onset    Cancer Mother         fatal renal cancer with mets     Social History   Social History     Tobacco Use    Smoking status: Former    Smokeless tobacco: Never    Tobacco comments:     smoked from 72 until 88; smoke on average 1ppd   Substance Use Topics    Alcohol use: Yes     Comment: one beer daily    Drug use: Never      A medically appropriate review of systems was performed with pertinent positives and  "negatives noted in the HPI, and all other systems negative.    Physical Exam   BP: 127/66  Pulse: 63  Temp: 97.8  F (36.6  C)  Resp: 16  Height: 179.1 cm (5' 10.5\")  Weight: 83.9 kg (185 lb)  SpO2: 99 %  Physical Exam  GEN: Well appearing, non toxic, cooperative  HEENT: normocephalic and atraumatic, PERRLA, EOMI, small, superficial abrasion to the left upper scalp without underlying hematoma  Neck: No point tenderness of the midline cervical spine, no step-offs or deformities, bilateral paraspinal tenderness  CV: well-perfused, normal skin color for ethnicity  PULM: breathing comfortably, in no respiratory distress  ABD: nondistended  EXT: Full range of motion.  No edema.  NEURO: awake, conversant, grossly normal bilateral upper and lower extremity strength & ROM, gross intact sensation to bilateral upper and lower extremities  SKIN: No rashes, ecchymosis, or lacerations  PSYCH: Calm and cooperative, interactive      ED Course, Procedures, & Data      Procedures          Results for orders placed or performed during the hospital encounter of 06/06/24   CT Head w/o Contrast     Status: None    Narrative    EXAM: CT HEAD W/O CONTRAST  LOCATION: [facility]  DATE: 6/6/2024    INDICATION: Head injury, anticoagulated.  COMPARISON: None.  TECHNIQUE: Routine CT Head without IV contrast. Multiplanar reformats. Dose reduction techniques were used.    FINDINGS:  INTRACRANIAL CONTENTS: No intracranial hemorrhage, extraaxial collection, or mass effect.  No CT evidence of acute infarct. Mild volume loss presumed chronic small vessel ischemia.    VISUALIZED ORBITS/SINUSES/MASTOIDS: No intraorbital abnormality. No paranasal sinus mucosal disease. No middle ear or mastoid effusion.    BONES/SOFT TISSUES: No acute abnormality.      Impression    IMPRESSION:  1.  No acute intracranial abnormality.    2.  Age-related and chronic ischemic changes.     Medications - No data to display  Labs " Ordered and Resulted from Time of ED Arrival to Time of ED Departure - No data to display  CT Head w/o Contrast   Final Result   IMPRESSION:   1.  No acute intracranial abnormality.      2.  Age-related and chronic ischemic changes.             Critical care was not performed.     Medical Decision Making  The patient's presentation was of moderate complexity (an acute complicated injury).    The patient's evaluation involved:  review of external note(s) from 2 sources (see separate area of note for details)  review of 3+ test result(s) ordered prior to this encounter (see separate area of note for details)  strong consideration of a test (see separate area of note for details) that was ultimately deferred  ordering and/or review of 1 test(s) in this encounter (see separate area of note for details)    The patient's management necessitated only low risk treatment.    Assessment & Plan    63-year-old male recently started on Eliquis in the setting of incidentally found PE presents emergency department due to head injury just prior to arrival with a small abrasion to the top of his scalp, but otherwise neurologically intact.    No other significant traumatic injuries noted.  CT does not demonstrate any evidence of any acute intracranial hemorrhage.  Patient as well as his wife are aware of the possibility of delayed brain bleeds especially in people who have anticoagulation.  We did discuss either holding him for 6 hours for repeat head CT here, versus conservative treatment with monitoring and management at home.  Overall, I find the risk of him having a delayed intracranial hemorrhage from the mechanism today to be fairly low.  Because he was on anticoagulation in the setting of an incidentally found PE, and is not currently symptomatic from it, holding on anticoagulation for the next couple of days is reasonable.  They will monitor for any changes in symptoms and return as needed    I have reviewed the nursing  notes. I have reviewed the findings, diagnosis, plan and need for follow up with the patient.    Discharge Medication List as of 6/7/2024 12:14 AM          Final diagnoses:   Injury of head, initial encounter   Anticoagulated       Alessia Dennis MD   [facility] EMERGENCY DEPARTMENT  6/6/2024     Alessia Dennis MD  06/07/24 0050

## 2024-06-07 NOTE — ED TRIAGE NOTES
Triage Assessment (Adult)       Row Name 06/06/24 2229          Triage Assessment    Airway WDL WDL        Respiratory WDL    Respiratory WDL WDL        Skin Circulation/Temperature WDL    Skin Circulation/Temperature WDL WDL        Cardiac WDL    Cardiac WDL WDL        Cognitive/Neuro/Behavioral WDL    Cognitive/Neuro/Behavioral WDL orientation;level of consciousness     Level of Consciousness alert     Orientation oriented x 4